# Patient Record
Sex: FEMALE | URBAN - METROPOLITAN AREA
[De-identification: names, ages, dates, MRNs, and addresses within clinical notes are randomized per-mention and may not be internally consistent; named-entity substitution may affect disease eponyms.]

---

## 2022-11-30 ENCOUNTER — APPOINTMENT (OUTPATIENT)
Dept: PHYSICAL THERAPY | Age: 27
End: 2022-11-30

## 2022-12-07 ENCOUNTER — HOSPITAL ENCOUNTER (OUTPATIENT)
Dept: PHYSICAL THERAPY | Age: 27
Discharge: HOME | End: 2022-12-07
Payer: OTHER GOVERNMENT

## 2022-12-07 PROCEDURE — 97535 SELF CARE MNGMENT TRAINING: CPT

## 2022-12-07 PROCEDURE — 97165 OT EVAL LOW COMPLEX 30 MIN: CPT

## 2022-12-07 PROCEDURE — 97110 THERAPEUTIC EXERCISES: CPT

## 2022-12-07 NOTE — PROGRESS NOTES
Hand Therapy Evaluation and Daily Note    Patient Name: Amandeep English  Date:2022  : 1995  Age: 28 y.o.y/o  [x]  Patient  Verified  Payor: LISA / Plan: Charles Ash 74 / Product Type: Javid Huerta /    Referring Provider: Ovi Norwood MD Visit:  none scheduled  Onset Date:  2022  Surgical Date: na  Surgical Procedure: na    In time:2:34 PM  Out time:3:20 PM  Total Treatment Time (min): 46  Total Timed Codes (min): 25  1:1 Treatment Time ( [address] only): 55   Visit #: 1 of 8    Treatment Area: Pain in right hand [M79.641]    Precautions:    Hand Dominance: right handed   Hand Involved: right    Total Evaluation Time:  21    History of Present Condition:  Patient is a right hand dominant  28 y.o. female with a chief complaint  of right hand pain beginning in 2022. Pt reports she was cracking her fingers and she had sharp pain with cracking her right thumb. Pt reports a cortisone injection to the first [facility] on 11/15/2022 with relief. Xrays revealed a  possible malalignment and tendon injury at the right thenar eminence. Pain Rating:   Current: (0-no pain 10-debilitating pain) mild 2/10  At best: (0-no pain 10-debilitating pain) mild 2/10  At worst: (0-no pain 10-debilitating pain) moderate 4/10  Location: right thumb  Type:  moderate   Better with: massage  Worse with: lifting    Medications/Allergies/Past Medical History:  See chart; reviewed with patient.  arthritis    Diagnostic Tests: xray on 11/3 - possible malalignment and tendon injury    Prior Level of Function: (I) with ADL/IADL tasks without functional limitations and pain using right hand    Current Level of Function:  Min A with ADl/IADL tasks with functional limitations and pain using right hand    Social History: Pt lives with spouse and infant son and family friend    Occupation/Job Requirements: [employer]      Palpation:  tenderness with palpation to the basal joint    Range of Motion:   THUMB ROM CHART as measured in degrees  Thumb, Side  Active/Passive Date:  12/7/2022  Right   MP 8-68   IP 0-85   Radial Abd. 70   Palmar Abd. 46   Opposition WNL       Range of Motion:   Elbow/Wrist   Wrist 12/7/2022  Right       Flex 73       Ext 73       UD 45       RD 19 p! Strength:   Measurements: Taken with Vijay Dynamometer, in Lbs   Level 2 12/7/2022  Date   Right 28    Left 52    Deficit     Change           Pinch Measurements: Taken with Pinch Gauge, in Lbs   (hand) 12/7/2022  Date   Lateral      Right 8    Left  14    Deficit     Change     Pad     Right 6    Left 13    Deficit     Change          Right 11    Left 13    Deficit     Change       Sensation:    tingling in thumb with activities     Edema: GIRTH CHART measured in cm  Date: 12/7/2022     Side Right/Left    DPC circum.  19.3/19.2    Wrist Crease 16.6/16.8    FA      Elbow         Special Tests:   12/7/2022 Nine-Hole Peg Test:  Left= 18 seconds  Right=20 seconds      ADLs  Feeding:        []MaxA   []ModA   []Kyle   [] CGA   []SBA   []Josep   [x]Independent  UE Dressing:       []MaxA   []ModA   []Kyle   [] CGA   []SBA   []Josep   [x]Independent  LE Dressing:       []MaxA   []ModA   []Kyle   [] CGA   []SBA   []Josep   [x]Independent  Grooming:       []MaxA   []ModA   []Kyle   [] CGA   []SBA   []Josep   [x]Independent  Toileting:       []MaxA   []ModA   []Kyle   [] CGA   []SBA   []Josep   [x]Independent  Bathing:       []MaxA   []ModA   []Kyle   [] CGA   []SBA   []Josep   [x]Independent  Light Meal Prep:    []MaxA   [x]ModA   []Kyle   [] CGA   []SBA   []Josep   []Independent  Household/Other: []MaxA   [x]ModA   []Kyle   [] CGA   []SBA   []Josep   []Independent  Adaptive Equip:     []MaxA   []ModA   []Kyle   [] CGA   []SBA   []Josep   []Independent  Driving:       []MaxA   []ModA   []Kyle   [] CGA   []SBA   []Josep   [x]Independent      Todays Treatment:  Patient received an initial evaluation today followed by education as to diagnosis, precautions and treatment plan. Patient was provided with a basic home exercise program including thumb AROM. OBJECTIVE  Modality rationale: decrease pain and increase tissue extensibility to improve the patients ability to move right thumb   Min Type Additional Details    [] Estim:  []Unatt       []IFC  []Premod                        []Other:  []w/ice   []w/heat  Position:  Location:    [] Estim: []Att    []TENS instruct  []NMES                    []Other:  []w/US   []w/ice   []w/heat  Position:  Location:    []  Traction: [] Cervical       []Lumbar                       [] Prone          []Supine                       []Intermittent   []Continuous Lbs:  [] before manual  [] after manual    []  Ultrasound: []Continuous   [] Pulsed                           []1MHz   []3MHz W/cm2:  Location:    []  Iontophoresis with dexamethasone         Location: [] Take home patch   [] In clinic   5 []  Ice     [x]  Heat MHP  []  Ice massage  []  Laser   []  Paraffin Position:seated, resting  Location: right hand    []  Laser with stim  []  Other:  Position:  Location:    []  Vasopneumatic Device Pressure:       [] lo [] med [] hi   Temperature: [] lo [] med [] hi       [x] Skin assessment post-treatment:  [x]intact [x]redness- no adverse reaction    10 min Therapeutic Exercise:  [] See flow sheet :   Rationale: increase ROM to improve the patients ability to move right thumb without increased pain. 10 min Self Care/Home Management: prognosis, diagnosis, activity modifications, splint wear. Rationale:  education   to improve the patients ability to reduce pain and improve functional use of right hand thumb.      With   [] TE   [] TA   [] neuro   [] other: Patient Education: [x] Review HEP    [] Progressed/Changed HEP based on:   [] positioning   [] body mechanics   [] transfers   [] heat/ice application   [] Splint wear/care   [] Sensory re-education   [] scar management      [] other:      Pain Level (0-10 scale) post treatment: 2/10    Patient will continue to benefit from skilled OT services to modify and progress therapeutic interventions, address ROM deficits, address strength deficits, analyze and address soft tissue restrictions, and instruct in home and community integration to attain goals. Assessment: Pt presents to skilled OT today rating her pain level 2/10 in the right hand that is worse with use and relieved with massage. There is tenderness with palpation to the basal joint. Her thumb AROM extension demonstrates an 8 degree extension lag and there is pain. Her right wrist AROM is [place] [place] however there is pain with RD. Her dominant right hand  and pinch strength is poor. There is mild FM limitations using dominant right hand due to limitations and painful use of the thumb. She reports Mod A with her IADL tasks and difficulty with gripping and buttoning.       Evaluation Complexity: History LOW Complexity : Brief history review  Examination LOW Complexity : 1-3 performance deficits relating to physical, cognitive , or psychosocial skils that result in activity limitations and / or participation restrictions  Clinical Decision Making LOW Complexity : No comorbidities that affect functional and no verbal or physical assistance needed to complete eval tasks   Overall Complexity Rating: LOW   Patient would benefit from OT/Hand therapy services for the following problems:  Problem List: Pain effecting function, Decreased range of motion, Decreased strength, Edema effecting function, Decreased coordination/prehension, Decreased ADL/functional abilities , Decreased activity tolerance, Decreased flexibility/joint mobility, and Sensability   Treatment Plan may include any combination of the following: Therapeutic exercise, Therapeutic activities, Physical agent/modality, Manual therapy, Splinting/orthoses, Patient education, and ADLs/IADLs  Patient / Family readiness to learn indicated by: asking questions, trying to perform skills, and interest  Persons(s) to be included in education:   patient (P)  Barriers to Learning/Limitations: None  Patient Goal (s): Javed Aguirre going away  Patient Self Reported Health Status: good  Rehabilitation Potential: good  Short Term Goals: To be accomplished in 2  weeks:  Goal:* Patient will be compliant with initial home exercise program to take an active role in their rehabilitation process. Status at Eval: Patient was provided with a basic home exercise program including thumb AROM. Goal:* Patient will demonstrate a good understanding of their condition and strategies for self-management. Status at Eval: pt educated on prognosis, diagnosis, activity modifications, splint wear. Long Term Goals: To be accomplished in 4 weeks:    Goal:*Patient will regain 75 degrees flexion of the right thumb MCP jt to enable grasp of cylindrical objects such as a glass, handle or toothbrush. Status at Eval:68 deg    Goal:*Patient will attain 5 degrees or less of right thumb extension to enable him/ her to grab and carry a cylindrical object. Status at Eval: 8 deg    Goal:* Patient will show a 9 point improvement on FOTO functional status measure to improve overall functional performance. Status at Eval: 61    Goal:* Pt will have 45 pounds of  in the right hand to allow for functional grasp for all ADL activities including dressing, bathing and self care. Status at eval: 28#    Goal:* Patient will have improved right lateral pinch strength of  12 pounds in order to perform fine motor tasks such as turning pages, turning ignition and open containers. Status at eval: 8#    Goal:* Patient will have improved right brissa pinch strength of 13 pounds in order to perform fine motor tasks such as writing.   Status at eval: 11#    Goal:* Pt will perform 9 hole peg test in less than 20 seconds in order to demonstrate ability to improve manipulation of small items with dominant right hand.  Status at eval: 20 seconds     Frequency / Duration: Patient to be seen 2 times per week for 4 weeks:    Patient/ Caregiver education and instruction: Diagnosis, prognosis, self care, activity modification, and exercises    Volodymyr Hoyt OT, 12/7/2022 2:38 PM

## 2022-12-07 NOTE — PROGRESS NOTES
[facility]  [address]  [place], [address]  [phone] fax    Plan of Care/Statement of Necessity for Occupational Therapy Services    Patient name: Rebeka Velazquez Start of Care: 2022   Referral source: Antonio Sutton* : 1995    Medical Diagnosis: Pain in right hand [M79.641]  Payor: LISA / Plan: Charles Ash 74 / Product Type: Daniel Caller /  Onset Date:2022    Treatment Diagnosis: right hand pain   Prior Hospitalization: see medical history Provider#: [id number]   Medications: Verified on Patient summary List    Comorbidities: arthritis   Prior Level of Function: (I) with ADL/IADL tasks without functional limitations and pain using right hand          The Plan of Care and following information is based on the information from the initial evaluation. Assessment/ key information: Patient is a right hand dominant  28 y.o. female with a chief complaint  of right hand pain beginning in 2022. Pt reports she was cracking her fingers and she had sharp pain with cracking her right thumb. Pt reports a cortisone injection to the first [facility] on 11/15/2022 with relief. Xrays revealed a  possible malalignment and tendon injury at the right thenar eminence. Pt presents to skilled OT today rating her pain level 2/10 in the right hand that is worse with use and relieved with massage. There is tenderness with palpation to the basal joint. Her thumb AROM extension demonstrates an 8 degree extension lag and there is pain. Her right wrist AROM is [place] [place] however there is pain with RD. Her dominant right hand  and pinch strength is poor. There is mild FM limitations using dominant right hand due to limitations and painful use of the thumb. She reports Mod A with her IADL tasks and difficulty with gripping and buttoning.  Patient received an initial evaluation today followed by education as to diagnosis, precautions and treatment plan. Patient was provided with a basic home exercise program including thumb AROM. Patient will continue to benefit from skilled OT services to modify and progress therapeutic interventions, address ROM deficits, address strength deficits, analyze and address soft tissue restrictions, and instruct in home and community integration to attain goals. Evaluation Complexity: History LOW Complexity : Brief history review  Examination LOW Complexity : 1-3 performance deficits relating to physical, cognitive , or psychosocial skils that result in activity limitations and / or participation restrictions  Clinical Decision Making LOW Complexity : No comorbidities that affect functional and no verbal or physical assistance needed to complete eval tasks   Overall Complexity Rating: LOW   Patient would benefit from OT/Hand therapy services for the following problems:  Problem List: Pain effecting function, Decreased range of motion, Decreased strength, Edema effecting function, Decreased coordination/prehension, Decreased ADL/functional abilities , Decreased activity tolerance, Decreased flexibility/joint mobility, and Sensability   Treatment Plan may include any combination of the following: Therapeutic exercise, Therapeutic activities, Physical agent/modality, Manual therapy, Splinting/orthoses, Patient education, and ADLs/IADLs  Patient / Family readiness to learn indicated by: asking questions, trying to perform skills, and interest  Persons(s) to be included in education:   patient (P)  Barriers to Learning/Limitations: None  Patient Goal (s): Buel Last going away  Patient Self Reported Health Status: good  Rehabilitation Potential: good  Short Term Goals: To be accomplished in 2  weeks:  Goal:* Patient will be compliant with initial home exercise program to take an active role in their rehabilitation process.   Status at Eval: Patient was provided with a basic home exercise program including thumb AROM. Goal:* Patient will demonstrate a good understanding of their condition and strategies for self-management. Status at Eval: pt educated on prognosis, diagnosis, activity modifications, splint wear. Long Term Goals: To be accomplished in 4 weeks:    Goal:*Patient will regain 75 degrees flexion of the right thumb MCP jt to enable grasp of cylindrical objects such as a glass, handle or toothbrush. Status at Eval:68 deg    Goal:*Patient will attain 5 degrees or less of right thumb extension to enable him/ her to grab and carry a cylindrical object. Status at Eval: 8 deg    Goal:* Patient will show a 9 point improvement on FOTO functional status measure to improve overall functional performance. Status at Eval: 61    Goal:* Pt will have 45 pounds of  in the right hand to allow for functional grasp for all ADL activities including dressing, bathing and self care. Status at eval: 28#    Goal:* Patient will have improved right lateral pinch strength of  12 pounds in order to perform fine motor tasks such as turning pages, turning ignition and open containers. Status at eval: 8#    Goal:* Patient will have improved right brissa pinch strength of 13 pounds in order to perform fine motor tasks such as writing. Status at eval: 11#    Goal:* Pt will perform 9 hole peg test in less than 20 seconds in order to demonstrate ability to improve manipulation of small items with dominant right hand. Status at eval: 20 seconds     Frequency / Duration: Patient to be seen 2 times per week for 4 weeks:    Patient/ Caregiver education and instruction: Diagnosis, prognosis, self care, activity modification, and exercises  [x]  Plan of care has been reviewed with Helene Osgood, OT 12/7/2022 5:03 PM  ________________________________________________________________________    I certify that the above Therapy Services are being furnished while the patient is under my care.  I agree with the treatment plan and certify that this therapy is necessary.     [de-identified] Signature:____________Date:_________TIME:________     Pamela Antonio Kaufman*  ** Signature, Date and Time must be completed for valid certification **    Please sign and return to [facility] [address] [place] [address] & [address]  [address]  [place], [address]  [phone] fax

## 2022-12-12 ENCOUNTER — HOSPITAL ENCOUNTER (OUTPATIENT)
Dept: PHYSICAL THERAPY | Age: 27
Discharge: HOME | End: 2022-12-12
Payer: OTHER GOVERNMENT

## 2022-12-12 PROCEDURE — 97110 THERAPEUTIC EXERCISES: CPT

## 2022-12-12 PROCEDURE — 97022 WHIRLPOOL THERAPY: CPT

## 2022-12-12 PROCEDURE — 97140 MANUAL THERAPY 1/> REGIONS: CPT

## 2022-12-12 NOTE — PROGRESS NOTES
OT DAILY TREATMENT NOTE     Patient Name: Divine Flowers  YRHT:  : 1995  [x]  Patient  Verified  Payor: LISA / Plan: Charles Ash 74 / Product Type:  /    In time:3:01 PM  Out time:3:40 PM  Total Treatment Time (min): 39  Visit #: 2 of 8    Treatment Area: Pain in right hand [M79.391]    SUBJECTIVE  Pain Level (0-10 scale): 3/10  Any medication changes, allergies to medications, adverse drug reactions, diagnosis change, or new procedure performed?: [x] No    [] Yes (see summary sheet for update)  Subjective functional status/changes:   [] No changes reported  \"It still hurts. I tried to push my phone into my pocket and that hurt a lot. \"    OBJECTIVE    Modality rationale: decrease pain and increase tissue extensibility to improve the patients ability to move right thumb for functional tasks.     Min Type Additional Details    [] Estim:  []Unatt       []IFC  []Premod                        []Other:  []w/ice   []w/heat  Position:  Location:    [] Estim: []Att    []TENS instruct  []NMES                    []Other:  []w/US   []w/ice   []w/heat  Position:  Location:    []  Traction: [] Cervical       []Lumbar                       [] Prone          []Supine                       []Intermittent   []Continuous Lbs:  [] before manual  [] after manual    []  Ultrasound: []Continuous   [] Pulsed                           []1MHz   []3MHz W/cm2:  Location:    []  Iontophoresis with dexamethasone         Location: [] Take home patch   [] In clinic   12 []  Ice     [x]  Heat - fluidotherapy  []  Ice massage  []  Laser   []  Paraffin Position: seated, AROM  Location: right thumb    []  Laser with stim  []  Other:  Position:  Location:    []  Vasopneumatic Device    []  Right     []  Left  Pre-treatment girth:  Post-treatment girth:  Measured at (location):  Pressure:       [] lo [] med [] hi   Temperature: [] lo [] med [] hi       [x] Skin assessment post-treatment:  [x]intact [x]redness- no adverse reaction    []redness - adverse reaction:     17 min Therapeutic Exercise:  [x] See flow sheet :   Rationale: increase ROM and improve coordination to improve the patients ability to move right thumb     10 min Manual Therapy:  IASTM using tool #6 using sweeping technique    The manual therapy interventions were performed at a separate and distinct time from the therapeutic activities interventions. Rationale: increase tissue extensibility and decrease edema  to right thumb and radial wrist    With   [] TE   [] TA   [] neuro   [] other: Patient Education: [x] Review HEP    [] Progressed/Changed HEP based on:   [] positioning   [] body mechanics   [] transfers   [] heat/ice application   [] Splint wear/care   [] Sensory re-education   [] scar management      [] other:            Other Objective/Functional Measures: good thumb flexion AROM with activities      Pain Level (0-10 scale) post treatment: 1/10    ASSESSMENT/Changes in Function: Initiated fluidotherapy and IASTM during this treatment session and patient reported reduced pain with this. Fair compliance with HEP with moderate cueing for thumb blocking AROM and thumb circumduction. Will progress with activities as tolerated. Patient will continue to benefit from skilled OT services to modify and progress therapeutic interventions, address ROM deficits, address strength deficits, analyze and address soft tissue restrictions, and instruct in home and community integration to attain remaining goals. []  See Plan of Care  []  See progress note/recertification  []  See Discharge Summary         Progress towards goals / Updated goals:  Short Term Goals: To be accomplished in 2  weeks:  Goal:* Patient will be compliant with initial home exercise program to take an active role in their rehabilitation process. Status at al: Patient was provided with a basic home exercise program including thumb AROM.    12/12/2022 - cueing for thumb HEP, pt reports completing HEP 1x per day      Goal:* Patient will demonstrate a good understanding of their condition and strategies for self-management. Status at Eval: pt educated on prognosis, diagnosis, activity modifications, splint wear. Long Term Goals: To be accomplished in 4 weeks:                       Goal:*Patient will regain 75 degrees flexion of the right thumb MCP jt to enable grasp of cylindrical objects such as a glass, handle or toothbrush. Status at Eval:68 deg     Goal:*Patient will attain 5 degrees or less of right thumb extension to enable him/ her to grab and carry a cylindrical object. Status at Eval: 8 deg     Goal:* Patient will show a 9 point improvement on FOTO functional status measure to improve overall functional performance. Status at Eval: 61     Goal:* Pt will have 45 pounds of  in the right hand to allow for functional grasp for all ADL activities including dressing, bathing and self care. Status at eval: 28#     Goal:* Patient will have improved right lateral pinch strength of  12 pounds in order to perform fine motor tasks such as turning pages, turning ignition and open containers. Status at eval: 8#     Goal:* Patient will have improved right brissa pinch strength of 13 pounds in order to perform fine motor tasks such as writing. Status at eval: 11#     Goal:* Pt will perform 9 hole peg test in less than 20 seconds in order to demonstrate ability to improve manipulation of small items with dominant right hand.   Status at eval: 20 seconds     PLAN  [x]  Upgrade activities as tolerated     [x]  Continue plan of care  []  Update interventions per flow sheet       []  Discharge due to:_  []  Other:_      Martina Lowery OT 12/12/2022  3:10 PM    Future Appointments   Date Time Provider Hilary Walls   12/16/2022  3:00 PM IVONNE Copeland [facility]   12/30/2022 11:30 [facility]

## 2022-12-16 ENCOUNTER — APPOINTMENT (OUTPATIENT)
Dept: PHYSICAL THERAPY | Age: 27
End: 2022-12-16
Payer: OTHER GOVERNMENT

## 2022-12-30 ENCOUNTER — TELEPHONE (OUTPATIENT)
Dept: PHYSICAL THERAPY | Age: 27
End: 2022-12-30

## 2022-12-30 ENCOUNTER — APPOINTMENT (OUTPATIENT)
Dept: PHYSICAL THERAPY | Age: 27
End: 2022-12-30
Payer: OTHER GOVERNMENT

## 2023-01-03 ENCOUNTER — TELEPHONE (OUTPATIENT)
Dept: PHYSICAL THERAPY | Age: 28
End: 2023-01-03

## 2023-01-11 ENCOUNTER — HOSPITAL ENCOUNTER (OUTPATIENT)
Dept: PHYSICAL THERAPY | Age: 28
Discharge: HOME | End: 2023-01-11
Payer: OTHER GOVERNMENT

## 2023-01-11 PROCEDURE — 97535 SELF CARE MNGMENT TRAINING: CPT

## 2023-01-11 PROCEDURE — 97018 PARAFFIN BATH THERAPY: CPT

## 2023-01-11 PROCEDURE — 97110 THERAPEUTIC EXERCISES: CPT

## 2023-01-11 NOTE — PROGRESS NOTES
OT DAILY TREATMENT NOTE     Patient Name: Patrick Pichardo  Date:2023  : 1995  [x]  Patient  Verified  Payor:  / Plan: Radha Enter / Product Type:  /    In time:1:01  Out time:1:31  Total Treatment Time (min): 30  Visit #: 3 of 8    Treatment Area: Pain in right hand [M79.641]    SUBJECTIVE  Pain Level (0-10 scale): 1/10  Any medication changes, allergies to medications, adverse drug reactions, diagnosis change, or new procedure performed?: [x] No    [] Yes (see summary sheet for update)  Subjective functional status/changes:   [] No changes reported     \"Been going good\" (with exercises)  \"Opening a tight jar and picking up my son gives me trouble\"    OBJECTIVE    Modality rationale: decrease pain and increase tissue extensibility to improve the patients ability to move right thumb for functional tasks.     Min Type Additional Details      [] Estim:  []Unatt       []IFC  []Premod                        []Other:  []w/ice   []w/heat  Position:  Location:      [] Estim: []Att    []TENS instruct  []NMES                    []Other:  []w/US   []w/ice   []w/heat  Position:  Location:      []  Traction: [] Cervical       []Lumbar                       [] Prone          []Supine                       []Intermittent   []Continuous Lbs:  [] before manual  [] after manual      []  Ultrasound: []Continuous   [] Pulsed                           []1MHz   []3MHz W/cm2:  Location:      []  Iontophoresis with dexamethasone         Location: [] Take home patch   [] In clinic    8 []  Ice     []  Heat   []  Ice massage  []  Laser   [x]  Paraffin Position: seated, AROM  Location: right thumb      []  Laser with stim  []  Other:  Position:  Location:      []  Vasopneumatic Device    []  Right     []  Left  Pre-treatment girth:  Post-treatment girth:  Measured at (location):  Pressure:       [] lo [] med [] hi   Temperature: [] lo [] med [] hi       [x] Skin assessment post-treatment: [x]intact [x]redness- no adverse reaction    []redness - adverse reaction:      22 min Therapeutic Exercise:  [] See flow sheet :   Rationale: increase ROM and increase strength to improve the patients ability to move wrist and  and pinch for functional daily tasks. Right hand/wrist:     Thumb ROM HEP for progress   Thumb ROM measurements for progress   Wrist ROM measurement for progress    and pinch measurements for progress   9-hole peg test for progress         10 min Self Care/Home Management: heat, massage strategies, activity modification strategies, treatment plan. Rationale:  patient education   to improve the patients ability to self-manage symptoms and improve functional use of right hand for daily tasks. With   [] TE   [] TA   [] neuro   [] other: Patient Education: [x] Review HEP    [x] Progressed/Changed HEP based on: independent with thumb ROM HEP's.   [] positioning   [] body mechanics   [] transfers   [] heat/ice application   [] Splint wear/care   [] Sensory re-education   [] scar management      [] other:            Other Objective/Functional Measures:        Range of Motion:   THUMB ROM CHART as measured in degrees  Thumb, Side  Active/Passive Date:  12/7/2022  Right 1/11/2023   Right    MP 8-68 2- 50   IP 0-85 0- 86   Radial Abd. 70 60   Palmar Abd. 46 48    Opposition WNL WNL          Strength:   Measurements: Taken with Vijay Dynamometer, in Lbs   Level 2 12/7/2022 1/11/2023   Right 28 49 ( +21)   Left 52     Deficit       Change             Pinch Measurements: Taken with Pinch Gauge, in Lbs   (hand) 12/7/2022 1/11/2023   Lateral        Right 8 14! (+6)   Left  14     Deficit       Change       Pad       Right 6 5 (-1)   Left 13     Deficit       Change              Right 11 11  (no change)   Left 13     Deficit       Change          Sensation:    tingling in thumb with activities , remains in index finger only.         Special Tests:   12/7/2022 Nine-Hole Peg Test:  Left= 18 seconds                Right=20 seconds  1/11/2023 Nine-Hole Peg Test:   Right= 19 seconds       Pain Level (0-10 scale) post treatment: 2/10    ASSESSMENT/Changes in Function: improved thumb IP and palmar abd ROM , decreased MP and radial abd ROM, improved  strength, improving lateral pinch only, pain is present with lateral pinches , slight improvement fine motor speed, slight increase in pain with ROM and strengthening measurements, inconsistent participation in HEP's and using modalities and strategies at home to manage right hand pain and stiffness. Patient will continue to benefit from skilled OT services to address ROM deficits, address strength deficits, analyze and address soft tissue restrictions, analyze and cue movement patterns, and analyze and modify body mechanics/ergonomics to attain remaining goals. [x]  See Plan of Care  []  See progress note/recertification  []  See Discharge Summary         Progress towards goals / Updated goals:    Short Term Goals: To be accomplished in 2  weeks:  Goal:* Patient will be compliant with initial home exercise program to take an active role in their rehabilitation process. Status at [facility]: Patient was provided with a basic home exercise program including thumb AROM. 12/12/2022 - cueing for thumb HEP, pt reports completing HEP 1x per day   Status at PN 1/11/2023: pt reports participation in HEP's 1x a day, Goal not met. Goal:* Patient will demonstrate a good understanding of their condition and strategies for self-management. Status at Eval: pt educated on prognosis, diagnosis, activity modifications, splint wear. Status at PN 1/11/2023: massage, not using heat at home, no splint wear at this time. Patient demonstrates a fair understanding of their condition and strategies for self-management. Goal not not met. Long Term Goals:  To be accomplished in 4 weeks:                       Goal:*Patient will regain 75 degrees flexion of the right thumb MCP jt to enable grasp of cylindrical objects such as a glass, handle or toothbrush. Status at Eval:68 deg  Status at PN 1/11/2023: 50 deg (-18) , decreased. Goal not met. Goal:*Patient will attain 5 degrees or less of right thumb extension to enable him/ her to grab and carry a cylindrical object. Status at Eval: 8 deg  Status at PN 1/11/2023: 2 deg (-6), goal met. Goal:* Patient will show a 9 point improvement on FOTO functional status measure to improve overall functional performance. Status at Eval: 64  Status at PN 1/11/2023: NT , due to limited visits. Goal not met. Goal:* Pt will have 45 pounds of  in the right hand to allow for functional grasp for all ADL activities including dressing, bathing and self care. Status at eval: 28#  Status at PN 1/11/2023: 49# ( +21), Goal met. Goal:* Patient will have improved right lateral pinch strength of  12 pounds in order to perform fine motor tasks such as turning pages, turning ignition and open containers. Status at eval: 8#  Status at PN 1/11/2023: 14! # (+6), Goal met. Goal:* Patient will have improved right brissa pinch strength of 13 pounds in order to perform fine motor tasks such as writing. Status at eval: 11#  Status at PN 1/11/2023: 11#  (no change) , Goal not met. Goal:* Pt will perform 9 hole peg test in less than 20 seconds in order to demonstrate ability to improve manipulation of small items with dominant right hand. Status at eval: 20 seconds   Status at PN 1/11/2023: 19 seconds (-1) Goal met. PLAN  []  Upgrade activities as tolerated     [x]  Continue plan of care  []  Update interventions per flow sheet       []  Discharge due to:_  []  Other:_      IVONNE Lee 1/11/2023  1:00 PM    No future appointments.

## 2023-01-11 NOTE — PROGRESS NOTES
[facility]  [address] [facility] [address] [facility], [facility] [address]  [phone] fax    Occupational Therapy Progress Note  Patient name: Rebeka Velazquez Start of Care:  2022   Referral source: Charly Tadeo DO : 1995   Medical/Treatment Diagnosis: Pain in right hand [M79.641]  Payor: LISA / Plan: Charles Ash 74 / Product Type: Belhalina Caller /  Onset Date:2022     Prior Hospitalization: see medical history Provider#: [id number]   Medications: Verified on Patient Summary List    Comorbidities: arthritis  Prior Level of Function: (I) with ADL/IADL tasks without functional limitations and pain using right hand                                                                 Visits from Start of Care: 3    Missed Visits: 0    Established Goals:         Excellent           Good         Limited           None  [x] Increased ROM   []  []  [x]  []  [x] Increased Strength  []  [x]  []  []  [] Increased Mobility  []  []  []  []   [x] Decreased Pain   []  []  [x]  []  [] Decreased Swelling  []  []  []  []  [] Increased Fine Motor Skills []  []  []  []  [x] Increased ADL [place] []  []  [x]  []    Key Functional Changes: improved thumb IP and palmar abd ROM , decreased MP and radial abd ROM, improved  strength, improving lateral pinch only, pain is present with lateral pinches , slight improvement fine motor speed, slight increase in pain with ROM and strengthening measurements    Progress on current goals:     Short Term Goals: To be accomplished in 2  weeks:  Goal:* Patient will be compliant with initial home exercise program to take an active role in their rehabilitation process. Status at Eval: Patient was provided with a basic home exercise program including thumb AROM. 2022 - cueing for thumb HEP, pt reports completing HEP 1x per day   Status at PN 2023: pt reports participation in HEP's 1x a day, Goal not met.       Goal:* Patient will demonstrate a good understanding of their condition and strategies for self-management. Status at Eval: pt educated on prognosis, diagnosis, activity modifications, splint wear. Status at PN 1/11/2023: massage, not using heat at home, no splint wear at this time. Patient demonstrates a fair understanding of their condition and strategies for self-management. Goal not not met. Long Term Goals: To be accomplished in 4 weeks:                       Goal:*Patient will regain 75 degrees flexion of the right thumb MCP jt to enable grasp of cylindrical objects such as a glass, handle or toothbrush. Status at Eval:68 deg  Status at PN 1/11/2023: 50 deg (-18) , decreased. Goal not met. Goal:*Patient will attain 5 degrees or less of right thumb extension to enable him/ her to grab and carry a cylindrical object. Status at Eval: 8 deg  Status at PN 1/11/2023: 2 deg (-6), goal met. Goal:* Patient will show a 9 point improvement on FOTO functional status measure to improve overall functional performance. Status at Eval: 64  Status at PN 1/11/2023: NT , due to limited visits. Goal not met. Goal:* Pt will have 45 pounds of  in the right hand to allow for functional grasp for all ADL activities including dressing, bathing and self care. Status at eval: 28#  Status at PN 1/11/2023: 49# ( +21), Goal met. Goal:* Patient will have improved right lateral pinch strength of  12 pounds in order to perform fine motor tasks such as turning pages, turning ignition and open containers. Status at eval: 8#  Status at PN 1/11/2023: 14! # (+6), Goal met. Goal:* Patient will have improved right brissa pinch strength of 13 pounds in order to perform fine motor tasks such as writing. Status at eval: 11#  Status at PN 1/11/2023: 11#  (no change) , Goal not met.       Goal:* Pt will perform 9 hole peg test in less than 20 seconds in order to demonstrate ability to improve manipulation of small items with dominant right hand. Status at eval: 20 seconds   Status at PN 1/11/2023: 19 seconds (-1) Goal met. Updated Goals: to be achieved in 4 weeks:    Goal:* Patient will be compliant with  home exercise program to take an active role in their rehabilitation process. Status at Eval: Patient was provided with a basic home exercise program including thumb AROM. Status at PN 1/11/2023: pt reports participation in HEP's 1x a day. Goal:* Patient will demonstrate a good understanding of their condition and strategies for self-management. Status at Eval: pt educated on prognosis, diagnosis, activity modifications, splint wear. Status at PN 1/11/2023: massage, not using heat at home, no splint wear at this time. Patient demonstrates a fair understanding of their condition and strategies for self-management. Goal:*Patient will regain 75 degrees flexion of the right thumb MCP jt to enable grasp of cylindrical objects such as a glass, handle or toothbrush. Status at PN 1/11/2023: 50 deg (-18) , decreased. Goal:* Patient will show a 9 point improvement on FOTO functional status measure to improve overall functional performance. Status at Eval: 64  Status at PN 1/11/2023: NT , due to limited visits. Goal:* Patient will have improved right brissa pinch strength of 13 pounds in order to perform fine motor tasks such as writing. Status at eval: 11#  Status at PN 1/11/2023: 11#  (no change). ASSESSMENT/RECOMMENDATIONS:  Patient reports inconsistent participation in HEP's and using modalities and strategies at home to manage symptoms. Patient will continue to benefit from skilled OT services to address ROM deficits, address strength deficits to attain goals.    [x]Continue therapy per initial plan/protocol at a frequency of  2 x per week for 4 weeks  []Continue therapy with the following recommended changes:_____________________ _____________________________________________________________________  []Discontinue therapy progressing towards or have reached established goals  []Discontinue therapy due to lack of appreciable progress towards goals  []Discontinue therapy due to lack of attendance or compliance  []Await Physician's recommendations/decisions regarding therapy  []Other:________________________________________________________________    Thank you for this referral.   IVONNE Garcia COTA/L  1/11/2023 1:50 PM  Co-sign: GARETT Gonzalez  NOTE TO PHYSICIAN:  [address]   FAX TO [facility]: [phone]  If you are unable to process this request in 24 hours please contact our office: [phone]    I have read the above report and request that my patient continue as recommended. I have read the above report and request that my patient continue therapy with the following changes/special instructions:__________________________________________________________  I have read the above report and request that my patient be discharged from therapy.     [de-identified] Signature:____________Date:_________TIME:________    [employer], Date and Time must be completed for valid certification **

## 2023-01-16 ENCOUNTER — HOSPITAL ENCOUNTER (OUTPATIENT)
Dept: PHYSICAL THERAPY | Age: 28
Discharge: HOME | End: 2023-01-16
Payer: OTHER GOVERNMENT

## 2023-01-16 PROCEDURE — 97110 THERAPEUTIC EXERCISES: CPT

## 2023-01-16 PROCEDURE — 97140 MANUAL THERAPY 1/> REGIONS: CPT

## 2023-01-16 PROCEDURE — 97018 PARAFFIN BATH THERAPY: CPT

## 2023-01-16 NOTE — PROGRESS NOTES
OT DAILY TREATMENT NOTE     Patient Name: Noemi Packer  Date:2023  : 1995  [x]  Patient  Verified  Payor: LISA / Plan: Charles Ash 74 / Product Type:  /    In time:5:35  Out time:6:05  Total Treatment Time (min): 30  Visit #: 1 of 8      Treatment Area: Pain in right hand [M79.641]    SUBJECTIVE  Pain Level (0-10 scale): 0/10  Any medication changes, allergies to medications, adverse drug reactions, diagnosis change, or new procedure performed?: [x] No    [] Yes (see summary sheet for update)  Subjective functional status/changes:   [] No changes reported    \"No pain today\"   \"The massage felt good on my thumb\"       OBJECTIVE    Modality rationale: decrease pain and increase tissue extensibility to improve the patients ability to move right thumb for functional tasks.     Min Type Additional Details       [] Estim:  []Unatt       []IFC  []Premod                        []Other:  []w/ice   []w/heat  Position:  Location:       [] Estim: []Att    []TENS instruct  []NMES                    []Other:  []w/US   []w/ice   []w/heat  Position:  Location:       []  Traction: [] Cervical       []Lumbar                       [] Prone          []Supine                       []Intermittent   []Continuous Lbs:  [] before manual  [] after manual       []  Ultrasound: []Continuous   [] Pulsed                           []1MHz   []3MHz W/cm2:  Location:       []  Iontophoresis with dexamethasone         Location: [] Take home patch   [] In clinic     8 []  Ice     []  Heat   []  Ice massage  []  Laser   [x]  Paraffin Position: seated, AROM  Location: right thumb       []  Laser with stim  []  Other:  Position:  Location:       []  Vasopneumatic Device    []  Right     []  Left  Pre-treatment girth:  Post-treatment girth:  Measured at (location):  Pressure:       [] lo [] med [] hi   Temperature: [] lo [] med [] hi        [x] Skin assessment post-treatment:  [x]intact [x]redness- no adverse reaction    []redness - adverse reaction:    12 min Therapeutic Exercise:  [] See flow sheet :   Rationale: increase ROM to improve the patients ability to move thumb and  for functional daily tasks. Right hand:     IP and MP blocking   Thumb circles   Opposition with marbles   Small dexterity balls   Translation with marbles - sets of 4       10 min Manual Therapy:  IASTM #6 using sweeping techniques    The manual therapy interventions were performed at a separate and distinct time from the therapeutic activities interventions. Rationale: decrease pain, increase ROM, increase tissue extensibility, and decrease edema  to right thumb eminence and basil jt. With   [] TE   [] TA   [] neuro   [] other: Patient Education: [] Review HEP    [] Progressed/Changed HEP based on:   [] positioning   [] body mechanics   [] transfers   [] heat/ice application   [] Splint wear/care   [] Sensory re-education   [] scar management      [] other:            Other Objective/Functional Measures:     Min difficulty with dexterity ball activity - clockwise direction  Decreased time participating in dexterity ball activity due to increased discomfort     Pain Level (0-10 scale) post treatment: 1/10    ASSESSMENT/Changes in Function: pt tolerated all ROM exercises well, slight increase in pain after constant use of right hand for clinic tasks, progress as tolerated. Patient will continue to benefit from skilled OT services to modify and progress therapeutic interventions, address ROM deficits, address strength deficits, analyze and address soft tissue restrictions, analyze and cue movement patterns, and analyze and modify body mechanics/ergonomics to attain remaining goals.      [x]  See Plan of Care  []  See progress note/recertification  []  See Discharge Summary         Progress towards goals / Updated goals:    Goal:* Patient will be compliant with  home exercise program to take an active role in their rehabilitation process. Status at Eval: Patient was provided with a basic home exercise program including thumb AROM. Status at PN 1/11/2023: pt reports participation in HEP's 1x a day. Goal:* Patient will demonstrate a good understanding of their condition and strategies for self-management. Status at Eval: pt educated on prognosis, diagnosis, activity modifications, splint wear. Status at PN 1/11/2023: massage, not using heat at home, no splint wear at this time. Patient demonstrates a fair understanding of their condition and strategies for self-management. Goal:*Patient will regain 75 degrees flexion of the right thumb MCP jt to enable grasp of cylindrical objects such as a glass, handle or toothbrush. Status at PN 1/11/2023: 50 deg (-18) , decreased. Goal:* Patient will show a 9 point improvement on FOTO functional status measure to improve overall functional performance. Status at Eval: 64  Status at PN 1/11/2023: NT , due to limited visits. Goal:* Patient will have improved right brissa pinch strength of 13 pounds in order to perform fine motor tasks such as writing. Status at eval: 11#  Status at PN 1/11/2023: 11#  (no change).     PLAN  []  Upgrade activities as tolerated     [x]  Continue plan of care  []  Update interventions per flow sheet       []  Discharge due to:_  []  Other:_      IVONNE Peraza 1/16/2023  2:07 PM    Future Appointments   Date Time Provider Hilary Walls   1/16/2023  5:30 PM IVONNE Wells [facility]   1/20/2023  9:00 AM RODRIGO Mercado [facility]   1/23/2023  5[facility]30 [facility]   1/27/2023  9:00 AM [facility]

## 2023-01-23 ENCOUNTER — HOSPITAL ENCOUNTER (OUTPATIENT)
Dept: PHYSICAL THERAPY | Age: 28
Discharge: HOME | End: 2023-01-23
Payer: OTHER GOVERNMENT

## 2023-01-23 PROCEDURE — 97018 PARAFFIN BATH THERAPY: CPT

## 2023-01-23 PROCEDURE — 97140 MANUAL THERAPY 1/> REGIONS: CPT

## 2023-01-23 PROCEDURE — 97110 THERAPEUTIC EXERCISES: CPT

## 2023-01-23 NOTE — PROGRESS NOTES
OT DAILY TREATMENT NOTE     Patient Name: Juan Titus  NBKB:  : [id number]  [x]  Patient  Verified  Payor:  / Plan: Charles Ash 73 / Product Type:  /    In time:5:35  Out time:6:07  Total Treatment Time (min): 32  Visit #: 3 of 8    Treatment Area: Pain in right hand [M79.741]    SUBJECTIVE  Pain Level (0-10 scale): 2-3/10  Any medication changes, allergies to medications, adverse drug reactions, diagnosis change, or new procedure performed?: [x] No    [] Yes (see summary sheet for update)  Subjective functional status/changes:   [] No changes reported    \"I hurt my hand today breaking down a box at work\"    OBJECTIVE    Modality rationale: decrease pain and increase tissue extensibility to improve the patients ability to move right thumb for functional tasks.     Min Type Additional Details       [] Estim:  []Unatt       []IFC  []Premod                        []Other:  []w/ice   []w/heat  Position:  Location:       [] Estim: []Att    []TENS instruct  []NMES                    []Other:  []w/US   []w/ice   []w/heat  Position:  Location:       []  Traction: [] Cervical       []Lumbar                       [] Prone          []Supine                       []Intermittent   []Continuous Lbs:  [] before manual  [] after manual       []  Ultrasound: []Continuous   [] Pulsed                           []1MHz   []3MHz W/cm2:  Location:       []  Iontophoresis with dexamethasone         Location: [] Take home patch   [] In clinic     10 []  Ice     []  Heat   []  Ice massage  []  Laser   [x]  Paraffin Position: seated, AROM  Location: right thumb       []  Laser with stim  []  Other:  Position:  Location:       []  Vasopneumatic Device    []  Right     []  Left  Pre-treatment girth:  Post-treatment girth:  Measured at (location):  Pressure:       [] lo [] med [] hi   Temperature: [] lo [] med [] hi        [x] Skin assessment post-treatment:  [x]intact [x]redness- no adverse reaction    []redness - adverse reaction:    12 min Therapeutic Exercise:  [] See flow sheet :   Rationale: increase ROM to improve the patients ability to move thumb and  for functional daily tasks. Right hand:     Dexterity ball - small - 1 min each way  Opposition with marbles  Translation with marbles sets of 4        10 min Manual Therapy:  IASTM #6 using sweeping techniques    The manual therapy interventions were performed at a separate and distinct time from the therapeutic activities interventions. Rationale: decrease pain, increase ROM, increase tissue extensibility, and decrease edema  to right thumb eminence and basal jt. With   [] TE   [] TA   [] neuro   [] other: Patient Education: [] Review HEP    [] Progressed/Changed HEP based on:   [] positioning   [] body mechanics   [] transfers   [] heat/ice application   [] Splint wear/care   [] Sensory re-education   [] scar management      [] other:            Other Objective/Functional Measures:     No difficulty with translation task. Pt tolerated all thumb ROM exercises well     Pain Level (0-10 scale) post treatment: 1/10    ASSESSMENT/Changes in Function: decreased pain with heat and massage modalities, improving ability to perform thumb ROM exercises without increased pain levels, strengthening not yet initiated during tx sessions,  progress as tolerated. Patient will continue to benefit from skilled OT services to modify and progress therapeutic interventions, address ROM deficits, address strength deficits, analyze and address soft tissue restrictions, analyze and cue movement patterns, and analyze and modify body mechanics/ergonomics to attain remaining goals. [x]  See Plan of Care  []  See progress note/recertification  []  See Discharge Summary         Progress towards goals / Updated goals:    Goal:* Patient will be compliant with  home exercise program to take an active role in their rehabilitation process.   Status at Eval: Patient was provided with a basic home exercise program including thumb AROM. Status at PN 1/11/2023: pt reports participation in HEP's 1x a day. 1/20/2023 - pt reports completing thumb HEP daily     Goal:* Patient will demonstrate a good understanding of their condition and strategies for self-management. Status at Eval: pt educated on prognosis, diagnosis, activity modifications, splint wear. Status at PN 1/11/2023: massage, not using heat at home, no splint wear at this time. Patient demonstrates a fair understanding of their condition and strategies for self-management. 1/20/2023 - pt reports completing self massage to thenar eminence and basal jt                Goal:*Patient will regain 75 degrees flexion of the right thumb MCP jt to enable grasp of cylindrical objects such as a glass, handle or toothbrush. Status at PN 1/11/2023: 50 deg (-18) , decreased. Goal:* Patient will show a 9 point improvement on FOTO functional status measure to improve overall functional performance. Status at Eval: 64  Status at PN 1/11/2023: NT , due to limited visits. Goal:* Patient will have improved right brissa pinch strength of 13 pounds in order to perform fine motor tasks such as writing. Status at eval: 11#  Status at PN 1/11/2023: 11#  (no change).     PLAN  []  Upgrade activities as tolerated     [x]  Continue plan of care  []  Update interventions per flow sheet       []  Discharge due to:_  []  Other:_      IVONNE Serna 1/23/2023  2:54 PM    Future Appointments   Date Time Provider Hilary Walls   1/23/2023  5:30 PM IVONNE [facility]   1/27/2023  9:00 [facility]

## 2023-01-27 ENCOUNTER — APPOINTMENT (OUTPATIENT)
Dept: PHYSICAL THERAPY | Age: 28
End: 2023-01-27
Payer: OTHER GOVERNMENT